# Patient Record
Sex: FEMALE | Race: WHITE
[De-identification: names, ages, dates, MRNs, and addresses within clinical notes are randomized per-mention and may not be internally consistent; named-entity substitution may affect disease eponyms.]

---

## 2019-07-09 LAB
ANION GAP SERPL CALC-SCNC: 12.7 MMOL/L (ref 8–16)
BASOPHILS # BLD AUTO: 0 10*3/UL (ref 0–0.1)
BASOPHILS NFR BLD AUTO: 0.7 % (ref 0–1)
BUN SERPL-MCNC: 8 MG/DL (ref 7–26)
BUN/CREAT SERPL: 10 (ref 6–25)
CALCIUM SERPL-MCNC: 9.7 MG/DL (ref 8.4–10.2)
CHLORIDE SERPL-SCNC: 104 MMOL/L (ref 98–107)
CO2 SERPL-SCNC: 27 MMOL/L (ref 22–29)
DEPRECATED APTT PLAS QN: 31.5 SECONDS (ref 23.8–35.5)
DEPRECATED INR PLAS: 0.92
DEPRECATED NEUTROPHILS # BLD AUTO: 3.3 10*3/UL (ref 2.1–6.9)
EGFRCR SERPLBLD CKD-EPI 2021: > 60 ML/MIN (ref 60–?)
EOSINOPHIL # BLD AUTO: 0 10*3/UL (ref 0–0.4)
EOSINOPHIL NFR BLD AUTO: 0.2 % (ref 0–6)
ERYTHROCYTE [DISTWIDTH] IN CORD BLOOD: 12.6 % (ref 11.7–14.4)
GLUCOSE SERPLBLD-MCNC: 91 MG/DL (ref 74–118)
HCT VFR BLD AUTO: 40.1 % (ref 34.2–44.1)
HGB BLD-MCNC: 13.9 G/DL (ref 12–16)
LYMPHOCYTES # BLD: 2.3 10*3/UL (ref 1–3.2)
LYMPHOCYTES NFR BLD AUTO: 38.8 % (ref 18–39.1)
MCH RBC QN AUTO: 32.3 PG (ref 28–32)
MCHC RBC AUTO-ENTMCNC: 34.7 G/DL (ref 31–35)
MCV RBC AUTO: 93 FL (ref 81–99)
MONOCYTES # BLD AUTO: 0.3 10*3/UL (ref 0.2–0.8)
MONOCYTES NFR BLD AUTO: 5.2 % (ref 4.4–11.3)
NEUTS SEG NFR BLD AUTO: 54.9 % (ref 38.7–80)
PLATELET # BLD AUTO: 162 X10E3/UL (ref 140–360)
POTASSIUM SERPL-SCNC: 3.7 MMOL/L (ref 3.5–5.1)
PROTHROMBIN TIME: 12.9 SECONDS (ref 11.9–14.5)
RBC # BLD AUTO: 4.31 X10E6/UL (ref 3.6–5.1)
SODIUM SERPL-SCNC: 140 MMOL/L (ref 136–145)

## 2019-07-09 NOTE — DIAGNOSTIC IMAGING REPORT
EXAMINATION:  CHEST 2 VIEWS    



INDICATION: Preoperative



COMPARISON: None

     

FINDINGS:

TUBES and LINES:  None.



LUNGS:  The lung volumes are normal. No focal consolidation or pulmonary edema.

Right lower lobe calcified granuloma.



PLEURA:  No pleural effusion or pneumothorax. 



HEART AND MEDIASTINUM: The cardiomediastinal silhouette is normal in size and

contour.



BONES AND SOFT TISSUES: No acute fracture or dislocation.



UPPER ABDOMEN: No free air under the diaphragm.



IMPRESSION: 

No focal pneumonia or pulmonary edema.



Signed by: Ophelia Villa MD on 7/9/2019 2:37 PM

## 2019-07-11 ENCOUNTER — HOSPITAL ENCOUNTER (OUTPATIENT)
Dept: HOSPITAL 88 - OR | Age: 69
Setting detail: OBSERVATION
LOS: 1 days | Discharge: HOME | End: 2019-07-12
Attending: NEUROLOGICAL SURGERY | Admitting: NEUROLOGICAL SURGERY
Payer: MEDICARE

## 2019-07-11 VITALS — SYSTOLIC BLOOD PRESSURE: 120 MMHG | DIASTOLIC BLOOD PRESSURE: 75 MMHG

## 2019-07-11 VITALS — BODY MASS INDEX: 24.45 KG/M2 | HEIGHT: 63 IN | WEIGHT: 138 LBS

## 2019-07-11 VITALS — SYSTOLIC BLOOD PRESSURE: 119 MMHG | DIASTOLIC BLOOD PRESSURE: 60 MMHG

## 2019-07-11 VITALS — SYSTOLIC BLOOD PRESSURE: 117 MMHG | DIASTOLIC BLOOD PRESSURE: 56 MMHG

## 2019-07-11 VITALS — DIASTOLIC BLOOD PRESSURE: 60 MMHG | SYSTOLIC BLOOD PRESSURE: 119 MMHG

## 2019-07-11 VITALS — SYSTOLIC BLOOD PRESSURE: 138 MMHG | DIASTOLIC BLOOD PRESSURE: 79 MMHG

## 2019-07-11 DIAGNOSIS — Z01.812: ICD-10-CM

## 2019-07-11 DIAGNOSIS — Z88.5: ICD-10-CM

## 2019-07-11 DIAGNOSIS — Z01.811: ICD-10-CM

## 2019-07-11 DIAGNOSIS — Z01.810: ICD-10-CM

## 2019-07-11 DIAGNOSIS — I10: ICD-10-CM

## 2019-07-11 DIAGNOSIS — R11.0: ICD-10-CM

## 2019-07-11 DIAGNOSIS — M47.22: ICD-10-CM

## 2019-07-11 DIAGNOSIS — M50.121: Primary | ICD-10-CM

## 2019-07-11 DIAGNOSIS — E03.9: ICD-10-CM

## 2019-07-11 PROCEDURE — 80048 BASIC METABOLIC PNL TOTAL CA: CPT

## 2019-07-11 PROCEDURE — 71046 X-RAY EXAM CHEST 2 VIEWS: CPT

## 2019-07-11 PROCEDURE — 86850 RBC ANTIBODY SCREEN: CPT

## 2019-07-11 PROCEDURE — 22845 INSERT SPINE FIXATION DEVICE: CPT

## 2019-07-11 PROCEDURE — 22552 ARTHRD ANT NTRBD CERVICAL EA: CPT

## 2019-07-11 PROCEDURE — 36415 COLL VENOUS BLD VENIPUNCTURE: CPT

## 2019-07-11 PROCEDURE — 85610 PROTHROMBIN TIME: CPT

## 2019-07-11 PROCEDURE — 88304 TISSUE EXAM BY PATHOLOGIST: CPT

## 2019-07-11 PROCEDURE — 77003 FLUOROGUIDE FOR SPINE INJECT: CPT

## 2019-07-11 PROCEDURE — 85025 COMPLETE CBC W/AUTO DIFF WBC: CPT

## 2019-07-11 PROCEDURE — 86900 BLOOD TYPING SEROLOGIC ABO: CPT

## 2019-07-11 PROCEDURE — 20931 SP BONE ALGRFT STRUCT ADD-ON: CPT

## 2019-07-11 PROCEDURE — 22551 ARTHRD ANT NTRBDY CERVICAL: CPT

## 2019-07-11 PROCEDURE — 85730 THROMBOPLASTIN TIME PARTIAL: CPT

## 2019-07-11 PROCEDURE — 93005 ELECTROCARDIOGRAM TRACING: CPT

## 2019-07-11 RX ADMIN — CEFAZOLIN SODIUM SCH MLS/HR: 1 SOLUTION INTRAVENOUS at 17:59

## 2019-07-11 RX ADMIN — Medication SCH MG: at 21:00

## 2019-07-11 RX ADMIN — SODIUM CHLORIDE, POTASSIUM CHLORIDE, SODIUM LACTATE AND CALCIUM CHLORIDE SCH MLS/HR: 600; 310; 30; 20 INJECTION, SOLUTION INTRAVENOUS at 17:59

## 2019-07-11 RX ADMIN — Medication SCH MG: at 16:09

## 2019-07-11 RX ADMIN — SODIUM CHLORIDE, POTASSIUM CHLORIDE, SODIUM LACTATE AND CALCIUM CHLORIDE SCH MLS/HR: 600; 310; 30; 20 INJECTION, SOLUTION INTRAVENOUS at 18:27

## 2019-07-11 RX ADMIN — CEFAZOLIN SODIUM SCH MLS/HR: 1 SOLUTION INTRAVENOUS at 23:45

## 2019-07-11 NOTE — XMS REPORT
Continuity of Care Document

                             Created on: 2019



INGRIDADOLFOAMNA

External Reference #: [id number]

: 1950

Sex: Female



Demographics







                          Address                   [address] [place] [address]  [address]

 

                          Home Phone                [phone]

 

                          Preferred Language        English

 

                          Marital Status            Unknown

 

                          Taoism Affiliation     Unknown

 

                          Race                      Unknown

 

                          Ethnic Group              Unknown





Author







                          Author                    [employer]

 

                          Organization              [employer]

 

                          Address                   Unknown

 

                          Phone                     Unavailable







Care Team Providers







                    Care Team Member Name    Role                Phone

 

                    [employer]    Unavailable         Unavailable



                                    



Problems

                    





                    Problem                            Status                            Onset Date     

                          Classification                            Date Reported       

                          Comments                            Source                    

 

                    CERVICAL PAINTRACTION                            Active                            2017

                                                                                       

                                        [facility] [place]                    

 

                    CERVICAL PAIN                            Active                            2017

                                                                                       

                                        [facility]                    

 

                          R07.9 - "CHEST PAIN, UNSPECIFIED"                            Active               

                    2015                                                                

                                                      [facility]                    

 

                    Cervical pain                            Active                                     

                          Problem                            2017                     

                                                      [facility]                    



                                                                                
                                       



Medications

        





                                        No Data Provided for This Section                    



                                                                



Allergies, Adverse Reactions, Alerts

                    





                    Substance                            Category                            Reaction   

                          Severity                            Reaction type           

                          Status                            Date Reported                     

                          Comments                            Source                    

 

                    codeine                            Assertion                                        

                                                Drug allergy                            Active

                                                                                    [facility]

 [facility] [place]                    



                                                        



Immunizations

        





                                        No Data Provided for This Section



                                     



Results







                                        No Data Provided for This Section



                    



Pathology Reports







                                        No Data Provided for This Section                    



                                                



Diagnostic Reports

                    





                    Report                            Value                            Date             

                                        Source                    

 

                          Chest 2 views DX                            EXAM: XR CHEST 2 VIEWS

DATE: 2019 10:58 CST

INDICATION: Precordial chest pain

COMPARISON: 2015

TECHNIQUE: PA and lateral chest radiographs

FINDINGS: A stable 5 mm calcified granuloma is seen in the right midlung. No 
lung parenchymal or pleural abnormalities are seen.  Teresita and pulmonary 
vasculature are normal. Cardiomediastinal silhouette is normal in appearance.  
No acute bony abnormality is identified. Multilevel spondylosis is again seen in
the thoracic spine.

IMPRESSION:

                                        1. No acute cardiopulmonary abnormality. No radiologic explanation for the patient's

 precordial chest pain.

                                        2. Stable calcified granuloma in the right midlung.

                            2019                            [facility]   

                 

 

                          Spine cervical wo contrast MRI                            Clinical Indication: Radiculopathy.



Comparison: Cervical spine MR 2017

TECHNIQUE: Multiplanar T1, T2, and STIR weighted MRI of the cervical spine is 
performed.

FINDINGS: 

ALIGNMENT: Unchanged straightening of the cervical lordosis. The 2 mm Grade 1 
anterolisthesis of C3 on C4 is unchanged.

VERTEBRAL BODIES:Normal in height and signal. No evidence of edema or infection.
Benign cyst in the right lateral mass of C2, likely degenerative.

SPINAL CORD: Normal in size and signal from the craniocervical junction to T1

SOFT TISSUES:No prevertebral or paravertebral soft tissue abnormalities.

Degenerative changes:

C2-C3: 

Normal disc height and signal.

No disc protrusion or extrusion.

No spinal canal or right foraminal stenosis. 

Facet hypertrophy results in mild left foraminal stenosis.



C3-C4: 

Normal disc height and signal.

No disc protrusion or extrusion.

No spinal canal or left foraminal stenosis. 

Facet/uncovertebral hypertrophy results in mild to moderate right foraminal 
stenosis.



C4-C5:

Mildly to moderately decreased disc height and signal.

No disc protrusion or extrusion.

Posterior disc osteophyte complex contacts but does not flatten the ventral 
cord. 

Facet/uncovertebral hypertrophy results in mild right and mild-to-moderate left 
foraminal stenosis.



C5-C6: 

Mildly to moderately decreased disc height and signal.

No focal disc protrusion or extrusion.

Posterior disc osteophyte complex contacts but does not flatten the ventral 
cord.

Facet and uncovertebral hypertrophy results in moderate to severe right and mild
to moderate left foraminal stenosis

C6-C7:

Normal disc height and signal.

No disc protrusion or extrusion.

No spinal canal or foraminal stenosis. 



C7-T1:

Normal disc height and signal.

No disc herniation.

No spinal canal or foraminal stenosis. 



IMPRESSION:

Degenerative changes of the cervical spine, most pronounced at C4-C5 and C5-C6 
where posterior disc osteophyte complexes result in mild spinal canal stenosis.

Multilevel foraminal stenosis, mild to moderate on the right at C3-C4, and on 
the left at C4-C5, and C5-C6; moderate to severe on the right at C5-C6.

Overall no significant change in the appearance of the cervical spine compared 
to 2017.

SL:  [id number]

                            01/15/2019                             [facility]

                    

 

                          Spine cervical wo contrast MRI                            Clinical Indication: M54.12

   Radiculopathy, cervical region, M54.2   Cervicalgia, R20.2   Paresthesia of 
skin, G56.90   Unspecified mononeuropathy of unspecified upper limb. Neck pain 
for months with right-sided hand numbness. Not responding to conservative 
management.

Comparison: None.

TECHNIQUE: Multiplanar T1, T2, and STIR weighted MRI of the cervical spine is 
performed. Full and complete MRI cervical spine without gadolinium contrast exam
was performed. Imaging was performed on the 3  Jonelle magnet.



FINDINGS: 

ALIGNMENT AND GENERAL ASSESSMENT: There is 2 mm anterolisthesis of C3 on C4. 
There is 2.5 mm retrolisthesis of C5 on C6. There are no fractures. The 
craniocervical junction is normal.  The atlanto-dental alignment appears 
unremarkable. Mild atlantodental degenerative changes are seen. The facet joint,
spinolaminar and spinous process alignment are normal.  

No bone marrow abnormality is present.  The prevertebral soft tissues are within
normal limits. The posterior paraspinal soft tissues are unremarkable.   

The visualized brainstem region is unremarkable.  The cervical spinal cord is 
normal in size and signal. 



DISC SPACES: Disc desiccation is seen throughout the cervical spine.

C2-3:  There is mild diffuse disc bulge. There are mild bilateral degenerative 
facet disease changes. No central spinal stenosis. Minimal left foraminal 
stenosis.

C3-4: There is mild diffuse disc bulge. There are mild to moderate bilateral 
degenerative facet disease changes with mild asymmetric right uncovertebral 
joint hypertrophy. No central spinal stenosis. Moderate right and mild to 
moderate left foraminal stenosis.

C4-5: There is moderate diffuse disc bulge with a left paracentral and foraminal
small disc osteophyte complex. Small right paracentral disc osteophyte complex 
is also seen. Moderate bilateral degenerative facet disease changes are seen. No
central spinal stenosis. Moderate right and severe left foraminal stenosis.

C5-6: There is a broad-based small central disc osteophyte complex. There are 
mild to moderate bilateral degenerative facet disease changes with moderate 
right and mild left uncovertebral joint hypertrophy. There is moderate 
hypertrophy of the ligamentum flavum. There is moderate central spinal stenosis 
with minimal anterior posterior spinal canal dimension of 7.6 mm. There is 
severe bilateral foraminal stenosis.

C6-7: There is mild diffuse disc bulge. There are mild bilateral degenerative 
facet disease changes. There is mild hypertrophy of the ligamentum flavum. No 
central spinal stenosis. No foraminal stenosis.

C7-T1: The disk is unremarkable.  Mild bilateral degenerative facet disease 
changes are seen. No central spinal stenosis. No foraminal stenosis.

IMPRESSION:

                                        1. 2.5 mm retrolisthesis of C5 on C6. Broad-based small central disc osteophyte 

complex at the C5-C6 level. Associated mild to moderate bilateral degenerative 
facet disease changes with moderate right and mild left uncovertebral joint 
hypertrophy. Moderate hypertrophy of the ligamentum flavum. Moderate central 
spinal stenosis with minimal anterior posterior spinal canal dimension of 7.6 
mm. Severe bilateral foraminal stenosis.

                                        2. 2 mm anterolisthesis of C3 on C4. Mild C2-C3 and C3-C4 and moderate C4-C5 diffuse

 disc bulges and associated degenerative facet disease changes. No central 
spinal stenosis. Moderate right C3-C4, mild to moderate left C3-C4, moderate 
right C4-C5 and severe left C4-C5 foraminal stenosis, as noted above.





SL:  [id number]

                            2017                            [facility]

 [facility]                    

 

                          Chest 2 views DX                            EXAM: XR CHEST TWO VIEWS

 

DATE: 2015

 

COMPARISON EXAMS: None.

 

CLINICAL INDICATION: Chest pain unspecified.

 

TECHNIQUE: PA and Lateral views

 

DISCUSSION: PA and lateral chest x-rays reveal two separate 3 to 4 mm calcified 
granulomas in the right mid lung on the PA view. No other lung parenchymal or 
pleural abnormalities are seen. The cardiomediastinal silhouette, hilar, and 
pulmonary vascular structures as well as the regional chest wall structures are 
normal in appearance. Tracheal and mainstem bronchial shadows are normal in 
appearance. Multilevel spondylosis is seen in the thoracic spine.

 

IMPRESSION: Calcified granulomas in the right mid lung, only seen on the PA 
view, and compatible with prior infectious or inflammatory granulomatous 
disease.

                            2015                            [facility]   

                 



                                                                                
                                   



Consultation Notes

                    





                                        No Data Provided for This Section                    



                                                            



Discharge Summaries

                    





                                        No Data Provided for This Section                    



                                                            



History and Physicals

                    





                                        No Data Provided for This Section                    



                                                                



Vital Signs

                         





                                        No Data Provided for This Section



                                                                 



Encounters

                    





                    Location                            Location Details                            Encounter

 Type                            Encounter Number                            Reason For

 Visit                            Attending Provider                            ADM Date

                            DC Date                            Status                

                                        Source                    

 

                          [facility] [place]                                                

                          Out Diag Services                            [id number]                  

                                                Janeth Roberts                             2015                                               

                                        [facility]                                                 

                          Outpt Diag Services                            [id number]                   

                                                Janeth Roberts                             2017                                               

                                        [facility]                    

 

                                                                            Outpatient                  

                    [id number]                                                        LOKESH AGUILAR JR                             2017                                           

                          Active                            [facility] [place]                                                        [facility] Patients

                            [id number]                                             

                          Lokesh Aguilar Jr                            2017                                                        [facility]

                    

 

                                                                            Outpatient                  

                    [id number]                                                        LOKESH AGUILAR JR                             2017                                           

                          [facility]                    

 

                                                                            Outpatient                  

                    [id number]                                                        LOKESH AGUILAR JR                             2017                                           

                          Active                            [facility] [place]                                                        OP Therapy Patients

                            [id number]                                             

                          Lokesh Aguilar Jr                            2017                     

                    09/10/2017                                                        [facility] [place]

                    



                                                                                
                                                               



Procedures

        





                                        No Data Provided for This Section



                                                    



Assessment and Plan

                    





                                        No Data Provided for This Section                    



                                     



Plan of Care







                                        No Data Provided for This Section                    



                                                                



Social History

                    





                    Social History                            Date                            Source    

                

 

                                        Social History TypeResponse

Smoking Status

Unknown if ever smoked; Exposure to Tobacco Smoke None; Cigarette Smoking Last 
365 Days No; Reg Smoking Cessation Counseling No

                            2017                            [facility] [place]   

                 

 

                          No data available for this section                            2017          

                                        [facility]                    

 

                          No data available for this section                            2015          

                                         [facility] [place]                    



                                                                                
                   



Family History

                    





                                        No Data Provided for This Section                    



                                                            



Advance Directives

                    





                                        No Data Provided for This Section                    



                                                            



Functional Status

                    





                                        No Data Provided for This Section

## 2019-07-11 NOTE — XMS REPORT
Summary of Care: 11/2/15 - 11/2/15

                             Created on: 03/15/2082



AMNA FOX

External Reference #: [id number]

: 1950

Sex: Female



Demographics







                          Address                   [address] [place]  [address]-

 

                          Home Phone                [phone]

 

                          Preferred Language        English

 

                          Marital Status            

 

                          Restoration Affiliation     Non Spiritism

 

                          Race                      White/

 

                          Ethnic Group              Unknown





Author







                          Author                    [facility]

 

                          Address                   Unknown

 

                          Phone                     Unavailable







Encounter





HQ Noelr_nikki(FIN) [id number] Date(s): 11/2/15 - 11/2/15

[facility] [address] [facility], [address] 

Discharge Disposition: Home

Attending Physician: Janeth Roberts MD





Vital Signs





No data available for this section



Problem List





No data available for this section



Allergies, Adverse Reactions, Alerts





No data available for this section



Medications





No data available for this section



Results





No data available for this section



Immunizations





No data available for this section



Procedures





No data available for this section



Social History





No data available for this section



Assessment and Plan





No data available for this section

## 2019-07-11 NOTE — XMS REPORT
Summary of Care: 17 - 17

                             Created on: 10/19/2129



AMNA FOX

External Reference #: [id number]

: 1950

Sex: Female



Demographics







                          Address                   [address] [place] [address]  [address]

 

                          Home Phone                [phone]

 

                          Preferred Language        English

 

                          Marital Status            

 

                          Evangelical Affiliation     Non Restorationist

 

                          Race                      White/

 

                          Ethnic Group              Unknown





Author







                          Author                    [facility] [employer]

 

                          Organization              [facility] [place]

 

                          Address                   Unknown

 

                          Phone                     Unavailable







Encounter





HQ Encntr_alias(FIN) [id number] Date(s): 17 - 17

[facility] [place]

Discharge Disposition: Home or Self Care

Attending Physician: Lokesh Echavarria MD





Vital Signs





No data available for this section



Problem List







    



              Condition     Effective Dates     Status       Health Status     Informant

 

    



                           Cervical                  Active  



                                         pain(Confirmed)    







Allergies, Adverse Reactions, Alerts







   



                 Substance       Reaction        Severity        Status

 

   



                           codeine                   Active







Medications





No data available for this section



Results





No data available for this section



Immunizations





No data available for this section



Procedures





No data available for this section



Social History







 



                           Social History Type       Response

 

 



                           Smoking Status            Unknown if ever smoked; Exposure to Tobacco Smoke None; Cigarette

 Smoking



                                         Last 365 Days No; Reg Smoking Cessation Counseling No







Assessment and Plan





No data available for this section

## 2019-07-11 NOTE — XMS REPORT
Clinical Summary

                             Created on: 2019



Cheryl Ramos

External Reference #: [id number]

: 1950

Sex: Female



Demographics







                          Address                   [address] [place]  [address]

 

                          Home Phone                [phone]

 

                          Preferred Language        English

 

                          Marital Status            

 

                          Latter day Affiliation     Unknown

 

                          Race                      Unknown

 

                          Ethnic Group              Non-





Author







                          Author                    [facility]

 

                          Organization              [facility]

 

                          Address                   Unknown

 

                          Phone                     Unavailable







Support







                Name            Relationship    Address         Phone

 

                    Wm Ramos        [employer]                [address] [place]  [address]                      [phone]







Care Team Providers







                    Care Team Member Name    Role                Phone

 

                    Janeth Roberts MD    PCP                 [phone]







Allergies







                                        Comments



                 Active Allergy     Reactions       Severity        Noted Date 

 

                                         



                     Codeine             GI                  2017 



                                         Intolerance   







Medications

Not on file



Active Problems





Not on file



Social History







                                        Date



                 Tobacco Use     Types           Packs/Day       Years Used 

 

                                         



                                         Never Smoker    









                                        Tobacco Cessation: Counseling Given: No











   



                 Alcohol Use     Drinks/Week     oz/Week         Comments

 

   



                                         Defer   









 



                           Sex Assigned at Birth     Date Recorded

 

 



                                         Not on file 









                                        Industry



                           Job Start Date            Occupation 

 

                                        Not on file



                           Not on file               Not on file 









                                        Travel End



                           Travel History            Travel Start 

 





                                         No recent travel history available.







Last Filed Vital Signs

Not on file



Plan of Treatment







   



                 Health Maintenance     Due Date        Last Done       Comments

 

   



                           BREAST CANCER SCREENING     2002  

 

   



                           COLONOSCOPY SCREENING     2002  

 

   



                           SHINGLES VACCINES (#1)     2002  

 

   



                           65+ PNEUMOCOCCAL VACCINE     2017  



                                         (1 of 2 - PCV13)   

 

   



                           INFLUENZA VACCINE         2019  







Results

Not on fileafter 07/10/2018



Insurance







                                        Type



            Payer      Benefit     Subscriber ID     Effective     Phone      Address 



                           Plan /                    Dates   



                                         Group     

 

                                        HMO



                 AETNA MEDICARE     AETNA           xxxxxxxx        2017-   



                           MEDICARE                  Present   



                                         HMO/PPO     



                                         [facility]     









     



            Guarantor Name     Account     Relation to     Date of     Phone      Billing Address



                     Type                Patient             Birth  

 

     



            Cheryl Ramos     Personal/F     Self       1950     [phone]     [address] [facility]





                     amily               (Home)              [place] [address]







Advance Directives





Patient has advance care planning documents on file. For more information, codie forrest contact:



Sudhakar Cardona



[address] [place] [address]

## 2019-07-11 NOTE — NUR
patient aaox3, resting in bed. patient distraught- in and out of tears. spoke with patient 
about concerns- states " i dont know why i am crying, maybe it was the pain medications from 
earlier". patient now calm, without tears resting in bed,  at bed side. anterior neck 
drsg c/d/i, hard collar on. no needs voiced. bed locked, lowest position, bed alarm on, and 
call light within easy reach. will continue to closely monitor patient

## 2019-07-11 NOTE — XMS REPORT
Summary of Care: 17 - 8/3/17

                             Created on: 10/14/2081



AMNA FOX

External Reference #: [id number]

: 1950

Sex: Female



Demographics







                          Address                   [address] [place] [address]  [address]

 

                          Home Phone                [phone]

 

                          Preferred Language        English

 

                          Marital Status            

 

                          Jew Affiliation     Non Islam

 

                          Race                      White/

 

                          Ethnic Group              Non-





Author







                          Author                    [facility]

 

                          Organization              [facility]

 

                          Address                   Unknown

 

                          Phone                     Unavailable







Encounter





HQ Encntr_alias(FIN) [id number] Date(s): 17 - 8/3/17

[facility]

Discharge Disposition: Home or Self Care

Attending Physician: Lokesh Echavarria MD





Vital Signs





No data available for this section



Problem List







    



              Condition     Effective Dates     Status       Health Status     Informant

 

    



                           Cervical                  Active  



                                         pain(Confirmed)    







Allergies, Adverse Reactions, Alerts







   



                 Substance       Reaction        Severity        Status

 

   



                           codeine                   Active







Medications





No data available for this section



Results





No data available for this section



Immunizations





No data available for this section



Procedures





No data available for this section



Social History







 



                           Social History Type       Response

 

 



                           Smoking Status            Unknown if ever smoked; Exposure to Tobacco Smoke None; Cigarette

 Smoking



                                         Last 365 Days No; Reg Smoking Cessation Counseling No







Assessment and Plan





No data available for this section

## 2019-07-11 NOTE — XMS REPORT
Patient Summary Document

                             Created on: 2019



AMNA FOX

External Reference #: [id number]

: 1950

Sex: Female



Demographics







                          Address                   [address] [place] [address]  [address]

 

                          Home Phone                [phone]

 

                          Preferred Language        Unknown

 

                          Marital Status            Unknown

 

                          Sikhism Affiliation     Unknown

 

                          Race                      Unknown

 

                                        Additional Race(s)  

 

                          Ethnic Group              Unknown





Author







                          Author                    [facility]

 

                          [facility]

 

                          Address                   Unknown

 

                          Phone                     Unavailable







Care Team Providers







                    Care Team Member Name    Role                Phone

 

                    HARRISON VILLEGAS    Unavailable         Unavailable







Problems

This patient has no known problems.



Allergies, Adverse Reactions, Alerts

This patient has no known allergies or adverse reactions.



Medications

This patient has no known medications.



Results







           Test Description    Test Time    Test Comments    Text Results    Atomic Results    Result

 Comments

 

                CHEST 2 VIEWS    2019 14:36:00                                                             

                                             [place] [address]  
   Patient Name: AMNA FOX                                   MR #: 
[id number]                     : 1950                                  
Age/Sex: 68/F  Acct #: [id number]                              Req #: [id number]
[id number]  Adm Physician:                                                      
Ordered by: HARRISON VILLEGAS MD                            Report #: [id number]  
     Location: OR                                      Room/Bed:                
    
___________________________________________________________________________________________________
   Procedure: [id number] DX/CHEST 2 VIEWS  Exam Date:                            
Exam Time:                                               REPORT STATUS: Signed  
 EXAMINATION:  CHEST 2 VIEWS          INDICATION: Preoperative      COMPARISON: 
None           FINDINGS:   TUBES and LINES:  None.      LUNGS:  The lung volumes
are normal. No focal consolidation or pulmonary edema.   Right lower lobe 
calcified granuloma.      PLEURA:  No pleural effusion or pneumothorax.       
HEART AND MEDIASTINUM: The cardiomediastinal silhouette is normal in size and   
contour.      BONES AND SOFT TISSUES: No acute fracture or dislocation.      
UPPER ABDOMEN: No free air under the diaphragm.      IMPRESSION:    No focal 
pneumonia or pulmonary edema.      Signed by: Corey Rock MD on 2019 2:37 PM
       Dictated By: COREY ROCK MD  Electronically Signed By: COREY ROCK MD on 
19 1437  Transcribed By: ADITI on 19 1437       COPY TO:   
HARRISON VILLEGAS MD                       

 

                SCR MAMM BILATERAL JONATHAN CAD DIGITAL    2018 15:55:04                     - SCR MAMM BILATERAL

 JONATHAN CAD DIGITALBILATERAL DIGITAL SCREENING MAMMOGRAM 3D/2D WITH CAD: 
2018CLINICAL: Asymptomatic.  Digital breast tomosynthesis was performed in 
addition to routine CC and MLO views.  Current mammographic images were 
evaluated by either a [employer] M-Vu or a [employer] ImageConjecturcker CAD (computer aided 
detection system).  Comparison is made to exams dated  2017 mammogram, 10/1
2016 mammogram, and 10/5/2015 mammogram - [facility].  The 
tissue of both breasts is heterogeneously dense. This may lower the sensitivity 
of mammography.  No suspicious mass, architectural distortion, malignant type 
calcification, or lymph node abnormality detected.  Breast architecture is 
stable compared to prior exams.IMPRESSION: NEGATIVEThere is no mammographic 
evidence of malignancy. Resume annual screening mammography in one year.  
Genetic counseling is recommended given patient's increased risk of breast 
cancer.Marlee Arrington M.D.          el/:2018 15:55:04  Imaging Technologist:
Marichuy VALDERRAMA, [facility] sent: BIRADS 1-2 Normal  
Mammogram BI-RADS: 1 Negative

## 2019-07-11 NOTE — XMS REPORT
Clinical Summary

                             Created on: 2019



Cheryl Ramos

External Reference #: [id number]

: 1950

Sex: Female



Demographics







                          Address                   [address] [place]  [address]

 

                          Home Phone                [phone]

 

                          Preferred Language        English

 

                          Marital Status            

 

                          Cheondoism Affiliation     Unknown

 

                          Race                      Unknown

 

                          Ethnic Group              Non-





Author







                          Author                    [facility]

 

                          Organization              [facility]

 

                          Address                   Unknown

 

                          Phone                     Unavailable







Support







                Name            Relationship    Address         Phone

 

                    Wm Ramos        [employer]                [address] [place]  [address]                      [phone]







Care Team Providers







                    Care Team Member Name    Role                Phone

 

                    Janeth Roberts MD    PCP                 [phone]







Allergies







                                        Comments



                 Active Allergy     Reactions       Severity        Noted Date 

 

                                         



                     Codeine             GI                  2017 



                                         Intolerance   







Medications

Not on file



Active Problems





Not on file



Social History







                                        Date



                 Tobacco Use     Types           Packs/Day       Years Used 

 

                                         



                                         Never Smoker    









                                        Tobacco Cessation: Counseling Given: No











   



                 Alcohol Use     Drinks/Week     oz/Week         Comments

 

   



                                         Defer   









 



                           Sex Assigned at Birth     Date Recorded

 

 



                                         Not on file 









                                        Industry



                           Job Start Date            Occupation 

 

                                        Not on file



                           Not on file               Not on file 









                                        Travel End



                           Travel History            Travel Start 

 





                                         No recent travel history available.







Last Filed Vital Signs

Not on file



Plan of Treatment







   



                 Health Maintenance     Due Date        Last Done       Comments

 

   



                           BREAST CANCER SCREENING     2002  

 

   



                           COLONOSCOPY SCREENING     2002  

 

   



                           SHINGLES VACCINES (#1)     2002  

 

   



                           65+ PNEUMOCOCCAL VACCINE     2017  



                                         (1 of 2 - PCV13)   

 

   



                           INFLUENZA VACCINE         2019  







Results

Not on fileafter 07/10/2018



Insurance







                                        Type



            Payer      Benefit     Subscriber ID     Effective     Phone      Address 



                           Plan /                    Dates   



                                         Group     

 

                                        HMO



                 AETNA MEDICARE     AETNA           xxxxxxxx        2017-   



                           MEDICARE                  Present   



                                         HMO/PPO     



                                         [facility]     









     



            Guarantor Name     Account     Relation to     Date of     Phone      Billing Address



                     Type                Patient             Birth  

 

     



            Cheryl Ramos     Personal/F     Self       1950     [phone]     [address] [facility]





                     amily               (Home)              [place] [address]







Advance Directives





Patient has advance care planning documents on file. For more information, codie forrest contact:



Sudhakar Cardona



[address] [place] [address]

## 2019-07-11 NOTE — OPERATIVE REPORT
DATE OF PROCEDURE:  07/11/2019

 

SURGEON:  Ward Brown MD

 

PREOPERATIVE DIAGNOSIS:  C4-5 and C5-6 spondylosis with radiculopathy, and M50.120.

 

POSTOPERATIVE DIAGNOSIS:  C4-5 and C5-6 spondylosis with radiculopathy, and M50.120.

 

PROCEDURES:  

1. C4-5 anterior cervical diskectomy and microsurgical osteophyte resection and

allograft fusion, 37379. 

2. C5-6 anterior cervical diskectomy and microsurgical osteophyte resection and

allograft fusion, 05409. 

3. Preparation of tricortical iliac crest allograft, 96147.

4. C5-6 and C6-7 anterior cervical plating with Synthes CSLP plate, 52557.

 

ANESTHESIA:  General.

 

INDICATIONS:  The patient is a 68-year-old woman, who presents with C4-5 and C5-6

spondylosis and spinal stenosis and foraminal stenosis with neck pain and cervical

radiculopathy refractory to conservative treatment.  She was seen in the operating room

for two-level anterior cervical decompression fusion. 

 

PROCEDURE IN DETAIL:  After induction of anesthesia, the patient was placed on the

operating table in supine position.  The right side of neck was prepped and draped in

sterile fashion.  The fluoroscopic C-arm was positioned in cross-table lateral

orientation.  A transverse incision was created on the right side of neck superimposed

on the C5 vertebral body as determined by fluoroscopy.  The platysma was divided in line

with the incision.  A subplatysmal dissection was carried out and avascular plane of

dissection was developed medial to the sternocleidomastoid muscle and was followed

medial to the carotid sheath to the anterior border of cervical spine.  The deep

cervical fascia was opened.  The esophagus was retracted to the left.  The attachments

of longus colli muscles to the anterolateral aspects of vertebral bodies of C4, C5, and

C6 were divided.  The anterior longitudinal ligament was resected.  Braintree posts were

inserted into C4 and C6 and the Braintree distractor was used to distract both disk spaces

simultaneously.  The anterior annuli of disks were incised with a #11 blade.  The

contents of both disks were thoroughly evacuated with angled curettes and pituitary

rongeurs.  The posterior osteophytes were meticulously drilled with a 2 mm cutting bur

until they were completely removed.  The posterior anulus of the disk, herniated disk

material, and the posterior longitudinal ligament were resected layer by layer until the

dura was fully exposed and decompressed.  The medial aspects of the uncinate processes

were resected bilaterally to further expose any compressed origins of the corresponding

nerve roots.  After satisfactory decompression had been achieved, the endplates were

prepared for fusion.  Two pieces of tricortical iliac crest allograft were cut to size

and shapes of the disk spaces and were inserted into the C4-5 and C5-6 disk spaces under

distraction and fluoroscopic guidance.  The distraction was released and distraction

posts were removed.  A Synthes CSLP small stature anterior cervical plate was selected

and was affixed to the vertebral bodies of C4, C5 and C6 with three pairs of 14 x 4.35

mm screws.  All screw holes were 1st drilled and tapped on the lateral fluoroscopic

guidance.  All screws were locked with the appropriate locking screws.  An excellent

construct was obtained.  The wound was copiously irrigated with bacitracin solution.

Meticulous hemostasis was secured.  Retractor was removed.  The platysma was closed with

3-0 Vicryl sutures.  The skin was closed with 4-0 Vicryl sutures in subcuticular

fashion.  Steri-Strips and dressing were applied.  The patient was 

awakened, extubated, and taken to postanesthesia care unit in stable condition.  No

intraoperative complications were encountered.  Estimated blood loss was 20 mL. 

 

 

 

 

______________________________

Ward Brown MD PP/NANCY

D:  07/11/2019 11:41:30

T:  07/11/2019 16:39:39

Job #:  [id number]

## 2019-07-11 NOTE — XMS REPORT
Summary of Care: 17 - 17

                             Created on: 2080



AMNA FOX

External Reference #: [id number]

: 1950

Sex: Female



Demographics







                          Address                   [address] [place]  [address]

 

                          Home Phone                [phone]

 

                          Preferred Language        English

 

                          Marital Status            

 

                          Zoroastrianism Affiliation     Non Buddhism

 

                          Race                      White/

 

                          Ethnic Group              Unknown





Author







                          Author                    [facility]

 

                          Address                   Unknown

 

                          Phone                     Unavailable







Encounter





HQ Encntr_alias(FIN) [id number] Date(s): 17 - 17

[facility] [address] [address]

Discharge Disposition: Home or Self Care

Attending Physician: Janeth Roberts MD





Vital Signs





No data available for this section



Problem List





No data available for this section



Allergies, Adverse Reactions, Alerts





No data available for this section



Medications





No data available for this section



Results





No data available for this section



Immunizations





No data available for this section



Procedures





No data available for this section



Social History





No data available for this section



Assessment and Plan





No data available for this section

## 2019-07-12 VITALS — DIASTOLIC BLOOD PRESSURE: 62 MMHG | SYSTOLIC BLOOD PRESSURE: 124 MMHG

## 2019-07-12 VITALS — DIASTOLIC BLOOD PRESSURE: 59 MMHG | SYSTOLIC BLOOD PRESSURE: 103 MMHG

## 2019-07-12 VITALS — DIASTOLIC BLOOD PRESSURE: 59 MMHG | SYSTOLIC BLOOD PRESSURE: 107 MMHG

## 2019-07-12 VITALS — SYSTOLIC BLOOD PRESSURE: 124 MMHG | DIASTOLIC BLOOD PRESSURE: 62 MMHG

## 2019-07-12 RX ADMIN — CEFAZOLIN SODIUM SCH MLS/HR: 1 SOLUTION INTRAVENOUS at 08:57

## 2019-07-12 RX ADMIN — Medication SCH MG: at 08:57

## 2019-07-12 RX ADMIN — SODIUM CHLORIDE, POTASSIUM CHLORIDE, SODIUM LACTATE AND CALCIUM CHLORIDE SCH MLS/HR: 600; 310; 30; 20 INJECTION, SOLUTION INTRAVENOUS at 02:19

## 2019-07-12 NOTE — NUR
PT DISCHARGE INSTRUCTIONS GIVEN AND PRESCRIPTIONS

PT VERBALIZED UNDERSTANDING

PT IV DC PRESSURE DRESSING APPLIED AND TAPED

DRESSING TO NECK REMOVED, STERI STRIPS LEFT IN PLACE OPEN TO AIR

SITE IS CLEAN AND DRY

NECK STABILIZER IN PLACE

PT IS NOW OFF UNIT TO HOME VIA WHEEL CHAIR

## 2019-07-12 NOTE — NUR
report given to am nurse for continuity of care. patient aaox3, stable condition, up 
performing oral care. no needs voiced. instructed to call for assistance as needed, patient 
verbalized understanding.

## 2025-01-24 NOTE — XMS REPORT
Continuity of Care Document

                             Created on: 2019



INGRIDADOLFOAMNA

External Reference #: [id number]

: 1950

Sex: Female



Demographics







                          Address                   [address] [place] [address]  [id number]

 

                          Home Phone                [phone]

 

                          Preferred Language        English

 

                          Marital Status            Unknown

 

                          Restoration Affiliation     Unknown

 

                          Race                      Unknown

 

                          Ethnic Group              Unknown





Author







                          Author                    [facility]

 

                          Organization              [facility]

 

                          Address                   Unknown

 

                          Phone                     Unavailable







Care Team Providers







                    Care Team Member Name    Role                Phone

 

                    [employer]    Unavailable         Unavailable



                                    



Problems

                    





                    Problem                            Status                            Onset Date     

                          Classification                            Date Reported       

                          Comments                            Source                    

 

                    CERVICAL PAINTRACTION                            Active                            2017

                                                                                       

                                        [facility]                    

 

                    CERVICAL PAIN                            Active                            2017

                                                                                       

                                        [facility]                    

 

                          R07.9 - "CHEST PAIN, UNSPECIFIED"                            Active               

                    2015                                                                

                                                      [facility]                    

 

                    Cervical pain                            Active                                     

                          Problem                            2017                     

                                                      [facility]                    



                                                                                
                                       



Medications

        





                                        No Data Provided for This Section                    



                                                                



Allergies, Adverse Reactions, Alerts

                    





                    Substance                            Category                            Reaction   

                          Severity                            Reaction type           

                          Status                            Date Reported                     

                          Comments                            Source                    

 

                    codeine                            Assertion                                        

                                                Drug allergy                            Active

                                                                                    [facility]                    



                                                        



Immunizations

        





                                        No Data Provided for This Section



                                     



Results







                                        No Data Provided for This Section



                    



Pathology Reports







                                        No Data Provided for This Section                    



                                                



Diagnostic Reports

                    





                    Report                            Value                            Date             

                                        Source                    

 

                          Chest 2 views DX                            EXAM: XR CHEST 2 VIEWS

DATE: 2019 10:58 CST

INDICATION: Precordial chest pain

COMPARISON: 2015

TECHNIQUE: PA and lateral chest radiographs

FINDINGS: A stable 5 mm calcified granuloma is seen in the right midlung. No 
lung parenchymal or pleural abnormalities are seen.  Teresita and pulmonary 
vasculature are normal. Cardiomediastinal silhouette is normal in appearance.  
No acute bony abnormality is identified. Multilevel spondylosis is again seen in
the thoracic spine.

IMPRESSION:

                                        1. No acute cardiopulmonary abnormality. No radiologic explanation for the patient's

 precordial chest pain.

                                        2. Stable calcified granuloma in the right midlung.

                            2019                            [facility]   

                 

 

                          Spine cervical wo contrast MRI                            Clinical Indication: Radiculopathy.



Comparison: Cervical spine MR 2017

TECHNIQUE: Multiplanar T1, T2, and STIR weighted MRI of the cervical spine is 
performed.

FINDINGS: 

ALIGNMENT: Unchanged straightening of the cervical lordosis. The 2 mm Grade 1 
anterolisthesis of C3 on C4 is unchanged.

VERTEBRAL BODIES:Normal in height and signal. No evidence of edema or infection.
Benign cyst in the right lateral mass of C2, likely degenerative.

SPINAL CORD: Normal in size and signal from the craniocervical junction to T1

SOFT TISSUES:No prevertebral or paravertebral soft tissue abnormalities.

Degenerative changes:

C2-C3: 

Normal disc height and signal.

No disc protrusion or extrusion.

No spinal canal or right foraminal stenosis. 

Facet hypertrophy results in mild left foraminal stenosis.



C3-C4: 

Normal disc height and signal.

No disc protrusion or extrusion.

No spinal canal or left foraminal stenosis. 

Facet/uncovertebral hypertrophy results in mild to moderate right foraminal 
stenosis.



C4-C5:

Mildly to moderately decreased disc height and signal.

No disc protrusion or extrusion.

Posterior disc osteophyte complex contacts but does not flatten the ventral 
cord. 

Facet/uncovertebral hypertrophy results in mild right and mild-to-moderate left 
foraminal stenosis.



C5-C6: 

Mildly to moderately decreased disc height and signal.

No focal disc protrusion or extrusion.

Posterior disc osteophyte complex contacts but does not flatten the ventral 
cord.

Facet and uncovertebral hypertrophy results in moderate to severe right and mild
to moderate left foraminal stenosis

C6-C7:

Normal disc height and signal.

No disc protrusion or extrusion.

No spinal canal or foraminal stenosis. 



C7-T1:

Normal disc height and signal.

No disc herniation.

No spinal canal or foraminal stenosis. 



IMPRESSION:

Degenerative changes of the cervical spine, most pronounced at C4-C5 and C5-C6 
where posterior disc osteophyte complexes result in mild spinal canal stenosis.

Multilevel foraminal stenosis, mild to moderate on the right at C3-C4, and on 
the left at C4-C5, and C5-C6; moderate to severe on the right at C5-C6.

Overall no significant change in the appearance of the cervical spine compared 
to 2017.

SL:  [id number]

                            01/15/2019                             [facility]

                    

 

                          Spine cervical wo contrast MRI                            Clinical Indication: M54.12

   Radiculopathy, cervical region, M54.2   Cervicalgia, R20.2   Paresthesia of 
skin, G56.90   Unspecified mononeuropathy of unspecified upper limb. Neck pain 
for months with right-sided hand numbness. Not responding to conservative 
management.

Comparison: None.

TECHNIQUE: Multiplanar T1, T2, and STIR weighted MRI of the cervical spine is 
performed. Full and complete MRI cervical spine without gadolinium contrast exam
was performed. Imaging was performed on the 3  Jonelle magnet.



FINDINGS: 

ALIGNMENT AND GENERAL ASSESSMENT: There is 2 mm anterolisthesis of C3 on C4. 
There is 2.5 mm retrolisthesis of C5 on C6. There are no fractures. The 
craniocervical junction is normal.  The atlanto-dental alignment appears 
unremarkable. Mild atlantodental degenerative changes are seen. The facet joint,
spinolaminar and spinous process alignment are normal.  

No bone marrow abnormality is present.  The prevertebral soft tissues are within
normal limits. The posterior paraspinal soft tissues are unremarkable.   

The visualized brainstem region is unremarkable.  The cervical spinal cord is 
normal in size and signal. 



DISC SPACES: Disc desiccation is seen throughout the cervical spine.

C2-3:  There is mild diffuse disc bulge. There are mild bilateral degenerative 
facet disease changes. No central spinal stenosis. Minimal left foraminal 
stenosis.

C3-4: There is mild diffuse disc bulge. There are mild to moderate bilateral 
degenerative facet disease changes with mild asymmetric right uncovertebral 
joint hypertrophy. No central spinal stenosis. Moderate right and mild to 
moderate left foraminal stenosis.

C4-5: There is moderate diffuse disc bulge with a left paracentral and foraminal
small disc osteophyte complex. Small right paracentral disc osteophyte complex 
is also seen. Moderate bilateral degenerative facet disease changes are seen. No
central spinal stenosis. Moderate right and severe left foraminal stenosis.

C5-6: There is a broad-based small central disc osteophyte complex. There are 
mild to moderate bilateral degenerative facet disease changes with moderate 
right and mild left uncovertebral joint hypertrophy. There is moderate 
hypertrophy of the ligamentum flavum. There is moderate central spinal stenosis 
with minimal anterior posterior spinal canal dimension of 7.6 mm. There is 
severe bilateral foraminal stenosis.

C6-7: There is mild diffuse disc bulge. There are mild bilateral degenerative 
facet disease changes. There is mild hypertrophy of the ligamentum flavum. No 
central spinal stenosis. No foraminal stenosis.

C7-T1: The disk is unremarkable.  Mild bilateral degenerative facet disease 
changes are seen. No central spinal stenosis. No foraminal stenosis.

IMPRESSION:

                                        1. 2.5 mm retrolisthesis of C5 on C6. Broad-based small central disc osteophyte 

complex at the C5-C6 level. Associated mild to moderate bilateral degenerative 
facet disease changes with moderate right and mild left uncovertebral joint 
hypertrophy. Moderate hypertrophy of the ligamentum flavum. Moderate central 
spinal stenosis with minimal anterior posterior spinal canal dimension of 7.6 
mm. Severe bilateral foraminal stenosis.

                                        2. 2 mm anterolisthesis of C3 on C4. Mild C2-C3 and C3-C4 and moderate C4-C5 diffuse

 disc bulges and associated degenerative facet disease changes. No central 
spinal stenosis. Moderate right C3-C4, mild to moderate left C3-C4, moderate 
right C4-C5 and severe left C4-C5 foraminal stenosis, as noted above.





SL:  [id number]

                            2017                            [facility]

 [facility]                    

 

                          Chest 2 views DX                            EXAM: XR CHEST TWO VIEWS

 

DATE: 2015

 

COMPARISON EXAMS: None.

 

CLINICAL INDICATION: Chest pain unspecified.

 

TECHNIQUE: PA and Lateral views

 

DISCUSSION: PA and lateral chest x-rays reveal two separate 3 to 4 mm calcified 
granulomas in the right mid lung on the PA view. No other lung parenchymal or 
pleural abnormalities are seen. The cardiomediastinal silhouette, hilar, and 
pulmonary vascular structures as well as the regional chest wall structures are 
normal in appearance. Tracheal and mainstem bronchial shadows are normal in 
appearance. Multilevel spondylosis is seen in the thoracic spine.

 

IMPRESSION: Calcified granulomas in the right mid lung, only seen on the PA 
view, and compatible with prior infectious or inflammatory granulomatous 
disease.

                            2015                            [facility]   

                 



                                                                                
                                   



Consultation Notes

                    





                                        No Data Provided for This Section                    



                                                            



Discharge Summaries

                    





                                        No Data Provided for This Section                    



                                                            



History and Physicals

                    





                                        No Data Provided for This Section                    



                                                                



Vital Signs

                         





                                        No Data Provided for This Section



                                                                 



Encounters

                    





                    Location                            Location Details                            Encounter

 Type                            Encounter Number                            Reason For

 Visit                            Attending Provider                            ADM Date

                            DC Date                            Status                

                                        Source                    

 

                          [facility]                                                

                          Out Diag Services                            [id number]                  

                                                Janeth Roberts                             2015                                               

                                        [facility]                                                 

                          Outpt Diag Services                            [id number]                   

                                                Janeth Roberts                             2017                                               

                                        [facility]                    

 

                                                                            Outpatient                  

                    [id number]                                                        LOKESH AGUILRA JR                             2017                                           

                          Active                            [facility] [place]                                                        [facility] Patients

                            [id number]                                             

                          Lokesh Aguilar Jr                            2017                                                        [facility]

                    

 

                                                                            Outpatient                  

                    [id number]                                                        LOKESH AGUILAR JR                             2017                                           

                          [facility]                    

 

                                                                            Outpatient                  

                    [id number]                                                        LOKESH AGUILAR JR                             2017                                           

                          Active                            [facility]                                                        OP Therapy Patients

                            [id number]                                             

                          Lokesh Aguilar Jr                            2017                     

                    09/10/2017                                                        [facility]

                    



                                                                                
                                                               



Procedures

        





                                        No Data Provided for This Section



                                                    



Assessment and Plan

                    





                                        No Data Provided for This Section                    



                                     



Plan of Care







                                        No Data Provided for This Section                    



                                                                



Social History

                    





                    Social History                            Date                            Source    

                

 

                                        Social History TypeResponse

Smoking Status

Unknown if ever smoked; Exposure to Tobacco Smoke None; Cigarette Smoking Last 
365 Days No; Reg Smoking Cessation Counseling No

                            2017                            [facility]   

                 

 

                          No data available for this section                            2017          

                                        [facility]                    

 

                          No data available for this section                            2015          

                                         [facility]                    



                                                                                
                   



Family History

                    





                                        No Data Provided for This Section                    



                                                            



Advance Directives

                    





                                        No Data Provided for This Section                    



                                                            



Functional Status

                    





                                        No Data Provided for This Section Documentation:  I communicated with the patient and/or health care decision maker about lines on his forehead.  He wants to go on retina a prescription cream rather than over-the-counter..   Details of this discussion including any medical advice provided: We will start the patient on Retin-A cream 0.05% applied nightly.  45 g tube ordered.    Total Time: minutes: 5-10 minutes    John Zuluaga was evaluated through a synchronous (real-time) audio encounter. Patient identification was verified at the start of the visit. He (or guardian if applicable) is aware that this is a billable service, which includes applicable co-pays. This visit was conducted with the patient's (and/or legal guardian's) verbal consent. He has not had a related appointment within my department in the past 7 days or scheduled within the next 24 hours.   The patient was located at Home: [address] [place] [address]   [place] [address].  The provider was located at Facility (Appt Dept): [address] [place] [address].  Confirm you are appropriately licensed, registered, or certified to deliver care in the state where the patient is located as indicated above. If you are not or unsure, please re-schedule the visit: Yes, I confirm.     Note: not billable if this call serves to triage the patient into an appointment for the relevant concern    John Zuluaga is a 61 y.o. male evaluated via telephone on 1/24/2025 for Skin Problem  .        Alexi Centeno, DO